# Patient Record
Sex: MALE
[De-identification: names, ages, dates, MRNs, and addresses within clinical notes are randomized per-mention and may not be internally consistent; named-entity substitution may affect disease eponyms.]

---

## 2024-04-08 ENCOUNTER — NON-APPOINTMENT (OUTPATIENT)
Age: 44
End: 2024-04-08

## 2024-04-08 ENCOUNTER — APPOINTMENT (OUTPATIENT)
Dept: OPHTHALMOLOGY | Facility: CLINIC | Age: 44
End: 2024-04-08
Payer: COMMERCIAL

## 2024-04-08 PROCEDURE — 92286 ANT SGM IMG I&R SPECLR MIC: CPT

## 2024-04-08 PROCEDURE — 92025 CPTRIZED CORNEAL TOPOGRAPHY: CPT

## 2024-04-08 PROCEDURE — 92250 FUNDUS PHOTOGRAPHY W/I&R: CPT

## 2024-04-08 PROCEDURE — 92004 COMPRE OPH EXAM NEW PT 1/>: CPT

## 2024-08-12 NOTE — ASU PATIENT PROFILE, ADULT - FALL HARM RISK - UNIVERSAL INTERVENTIONS
Bed in lowest position, wheels locked, appropriate side rails in place/Call bell, personal items and telephone in reach/Instruct patient to call for assistance before getting out of bed or chair/Non-slip footwear when patient is out of bed/[place] to call system/Physically safe environment - no spills, clutter or unnecessary equipment/Purposeful Proactive Rounding/Room/bathroom lighting operational, light cord in reach

## 2024-08-12 NOTE — ASU PATIENT PROFILE, ADULT - NS PREOP UNDERSTANDS INFO
NPO 8hrs prior to surgery solids, clear fluid (water, clear apple juice, ginger ale) up to 3hrs prior escort required for discharge home, bring picture ID and insurance info.

## 2024-08-12 NOTE — OPERATIVE REPORT - OPERATIVE RPOSRT DETAILS
OPERATING SURGEON:  Himanshu Gruber MD    ASSISTANT SURGEON:  Cynthia Boyce MD    DATE OF SURGERY:  *    ANESTHESIA:  MAC/Peribulbar		    COMPLICATIONS:  none**    ESTIMATED BLOOD LOSS: < 1mL	    PREOPERATIVE DIAGNOSIS:  corneal scar, left eye    POSTOPERATIVE DIAGNOSIS:  same    OPERATIVE PROCEDURE:  Penetrating Keratoplasty, left eye   Donor # **,  ** mm/ **  mm    PROCEDURE:	The patient was brought to the operating room and placed supine on the operating room table.  After uneventful induction of neuroleptic anesthesia, [place] and peribulbar blocks consisting of 4 cc lidocaine 2%/marcaine 0.75% were administered around the operative eye and behind the adjacent auricle.  The patient was prepped and draped in the usual sterile fashion.  A wire lid speculum was inserted into the operative eye giving a wide palpebral fissure.      Using Castrov[place]jo calipers an appropriate size trephination was determined.  ***A Flieringa ring was centered around the corneascleral limbus and secured to superficial sclera with interrupted silk sutures.  Attention was then addressed to the donor cornea which was inspected and found to be suitable for use. The donor was placed endothelial size up on a suction punch block.  A new disposable trephine was then used to fashion a donor corneal button.  This was covered with corneal storage media and set aside for later use. Attention was then readdressed to the recipient.  A new suction trephine was then centered over the cornea.  Suction was attained and cut down continued until the anterior chamber was entered.  The corneal button was then completely excised using a niles knife.  Viscoelastic was then used to coat the lens iris diaphragm.  The previously fashioned corneal donor button was then placed endothelial side down in the recipient bed.  It was secured  into position with 12 interrupted 10-0 nylon sutures at the clock hours.  These sutures were knotted securely and the knots buried.  A single running 10-0 nylon suture was then passed in 12 equally spaced bites between the interrupted sutures.  This suture was pulled tightly and knotted securely.  The knot was then buried in the recipient.  The anterior chamber was maintained throughout the procedure using balanced salt solution.  ***The Flieringa ring was then removed from the eye.  A sub-Tenon’s injection consisting of 4 mg methazone 100 mg cefazolin was administered inferiorly.  The lid speculum was removed from the eye and a shield and dressing placed over the eye.       The patient tolerated the procedure well and went to the recovery area in good condition.      ATTESTATION    I, Himanshu Gruber, was present for the entirety of the surgical procedure.  					     OPERATING SURGEON:  Himanshu Gruber MD    ASSISTANT SURGEON:  Cynthia Boyce MD    DATE OF SURGERY: 8/13/24    ANESTHESIA:  General     COMPLICATIONS:  none    ESTIMATED BLOOD LOSS: < 1mL	    PREOPERATIVE DIAGNOSIS:  corneal scar, left eye    POSTOPERATIVE DIAGNOSIS:  same    OPERATIVE PROCEDURE:  Penetrating Keratoplasty, left eye   Donor # [id number],  8.25 mm/ 8.5  mm    PROCEDURE:	The patient was brought to the operating room and placed supine on the operating room table.  After uneventful induction of general anesthesia, the patient was prepped and draped in the usual sterile fashion.  A wire lid speculum was inserted into the operative eye giving a wide palpebral fissure.      Using Castroviejo calipers an appropriate size trephination was determined.  A Flieringa ring was centered around the limbus and secured to superficial sclera with interrupted silk sutures.  Attention was then addressed to the donor cornea which was inspected and found to be suitable for use. The donor was placed endothelial size up on a suction punch block.  A new disposable trephine was then used to fashion a donor corneal button.  This was covered with corneal storage media and set aside for later use. Attention was then readdressed to the recipient.  A new suction trephine was then centered over the cornea.  Suction was attained and cut down continued until the anterior chamber was entered.  The corneal button was then completely excised using a niles knife.  Viscoelastic was then used to coat the lens iris diaphragm.  The previously fashioned corneal donor button was then placed endothelial side down in the recipient bed.  It was secured  into position with 12 interrupted 10-0 nylon sutures at the clock hours.  These sutures were knotted securely and the knots buried.  A single running 10-0 nylon suture was then passed in 12 equally spaced bites between the interrupted sutures.  This suture was pulled tightly and knotted securely.  The knot was then buried in the recipient.  The anterior chamber was maintained throughout the procedure using balanced salt solution. The Flieringa ring was then removed from the eye.  A sub-Tenon’s injection consisting of 4 mg methazone 100 mg cefazolin was administered inferiorly.  The lid speculum was removed from the eye and a shield and dressing placed over the eye.       The patient tolerated the procedure well and went to the recovery area in good condition.      ATTESTATION    I, Himanshu Gruber, was present for the entirety of the surgical procedure.  					     OPERATING SURGEON:  Himanshu Gruber MD    ASSISTANT SURGEON:  Cynthia Boyce MD    DATE OF SURGERY: 8/13/24    ANESTHESIA:  General     COMPLICATIONS:  none    ESTIMATED BLOOD LOSS: < 1mL	    PREOPERATIVE DIAGNOSIS:  corneal scar, left eye    POSTOPERATIVE DIAGNOSIS:  same    OPERATIVE PROCEDURE:  Penetrating Keratoplasty, left eye   Donor # [id number],  8.25 mm/ 8.25  mm    PROCEDURE:	The patient was brought to the operating room and placed supine on the operating room table.  After uneventful induction of general anesthesia, the patient was prepped and draped in the usual sterile fashion.  A wire lid speculum was inserted into the operative eye giving a wide palpebral fissure.      Using Castroviejo calipers an appropriate size trephination was determined.  A Flieringa ring was centered around the limbus and secured to superficial sclera with interrupted silk sutures.  Attention was then addressed to the donor cornea which was inspected and found to be suitable for use. The donor was placed endothelial size up on a suction punch block.  A new disposable trephine was then used to fashion a donor corneal button.  This was covered with corneal storage media and set aside for later use. Attention was then readdressed to the recipient.  A new suction trephine was then centered over the cornea.  Suction was attained and cut down continued until the anterior chamber was entered.  The corneal button was then completely excised using a niles knife.  Viscoelastic was then used to coat the lens iris diaphragm.  The previously fashioned corneal donor button was then placed endothelial side down in the recipient bed.  It was secured  into position with 12 interrupted 10-0 nylon sutures at the clock hours.  These sutures were knotted securely and the knots buried.  A single running 10-0 nylon suture was then passed in 12 equally spaced bites between the interrupted sutures.  This suture was pulled tightly and knotted securely.  The knot was then buried in the recipient.  The anterior chamber was maintained throughout the procedure using balanced salt solution. The Flieringa ring was then removed from the eye.  A sub-Tenon’s injection consisting of 4 mg methazone 100 mg cefazolin was administered inferiorly.  The lid speculum was removed from the eye and a shield and dressing placed over the eye.       The patient tolerated the procedure well and went to the recovery area in good condition.      ATTESTATION    I, Himanshu Gruber, was present for the entirety of the surgical procedure.

## 2024-08-13 ENCOUNTER — APPOINTMENT (OUTPATIENT)
Dept: OPHTHALMOLOGY | Facility: AMBULATORY SURGERY CENTER | Age: 44
End: 2024-08-13

## 2024-08-13 ENCOUNTER — TRANSCRIPTION ENCOUNTER (OUTPATIENT)
Age: 44
End: 2024-08-13

## 2024-08-13 ENCOUNTER — OUTPATIENT (OUTPATIENT)
Dept: OUTPATIENT SERVICES | Facility: HOSPITAL | Age: 44
LOS: 1 days | Discharge: HOME | End: 2024-08-13
Payer: COMMERCIAL

## 2024-08-13 ENCOUNTER — RESULT REVIEW (OUTPATIENT)
Age: 44
End: 2024-08-13

## 2024-08-13 VITALS
DIASTOLIC BLOOD PRESSURE: 80 MMHG | RESPIRATION RATE: 16 BRPM | HEART RATE: 87 BPM | SYSTOLIC BLOOD PRESSURE: 137 MMHG | TEMPERATURE: 98 F | OXYGEN SATURATION: 97 % | WEIGHT: 198.42 LBS | HEIGHT: 66 IN

## 2024-08-13 VITALS
OXYGEN SATURATION: 99 % | RESPIRATION RATE: 13 BRPM | DIASTOLIC BLOOD PRESSURE: 72 MMHG | SYSTOLIC BLOOD PRESSURE: 123 MMHG | HEART RATE: 76 BPM

## 2024-08-13 DIAGNOSIS — Z90.3 ACQUIRED ABSENCE OF STOMACH [PART OF]: Chronic | ICD-10-CM

## 2024-08-13 DIAGNOSIS — Z98.890 OTHER SPECIFIED POSTPROCEDURAL STATES: Chronic | ICD-10-CM

## 2024-08-13 PROCEDURE — 88304 TISSUE EXAM BY PATHOLOGIST: CPT | Mod: 26

## 2024-08-13 PROCEDURE — 65755 CORNEAL TRANSPLANT: CPT | Mod: LT

## 2024-08-13 RX ORDER — DEXTROSE MONOHYDRATE, SODIUM CHLORIDE, SODIUM LACTATE, CALCIUM CHLORIDE, MAGNESIUM CHLORIDE 1.5; 538; 448; 18.4; 5.08 G/100ML; MG/100ML; MG/100ML; MG/100ML; MG/100ML
500 SOLUTION INTRAPERITONEAL ONCE
Refills: 0 | Status: DISCONTINUED | OUTPATIENT
Start: 2024-08-13 | End: 2024-08-13

## 2024-08-13 RX ORDER — FENTANYL CITRATE 1200 UG/1
25 LOZENGE ORAL; TRANSMUCOSAL
Refills: 0 | Status: DISCONTINUED | OUTPATIENT
Start: 2024-08-13 | End: 2024-08-13

## 2024-08-13 RX ORDER — OFLOXACIN 0.3 %
1 DROPS OPHTHALMIC (EYE)
Refills: 0 | Status: COMPLETED | OUTPATIENT
Start: 2024-08-13 | End: 2024-08-13

## 2024-08-13 RX ORDER — FENTANYL CITRATE 1200 UG/1
50 LOZENGE ORAL; TRANSMUCOSAL
Refills: 0 | Status: DISCONTINUED | OUTPATIENT
Start: 2024-08-13 | End: 2024-08-13

## 2024-08-13 RX ORDER — ONDANSETRON HCL/PF 4 MG/2 ML
4 VIAL (ML) INJECTION ONCE
Refills: 0 | Status: DISCONTINUED | OUTPATIENT
Start: 2024-08-13 | End: 2024-08-13

## 2024-08-13 RX ORDER — TETRACAINE HCL 0.5 %
1 DROPS OPHTHALMIC (EYE) ONCE
Refills: 0 | Status: DISCONTINUED | OUTPATIENT
Start: 2024-08-13 | End: 2024-08-13

## 2024-08-13 RX ORDER — ATORVASTATIN CALCIUM 40 MG/1
1 TABLET, FILM COATED ORAL
Refills: 0 | DISCHARGE

## 2024-08-13 RX ORDER — HYDROMORPHONE HCL IN 0.9% NACL 0.2 MG/ML
0.5 PLASTIC BAG, INJECTION (ML) INTRAVENOUS
Refills: 0 | Status: DISCONTINUED | OUTPATIENT
Start: 2024-08-13 | End: 2024-08-13

## 2024-08-13 RX ORDER — DEXTROSE MONOHYDRATE, SODIUM CHLORIDE, SODIUM LACTATE, CALCIUM CHLORIDE, MAGNESIUM CHLORIDE 1.5; 538; 448; 18.4; 5.08 G/100ML; MG/100ML; MG/100ML; MG/100ML; MG/100ML
500 SOLUTION INTRAPERITONEAL
Refills: 0 | Status: DISCONTINUED | OUTPATIENT
Start: 2024-08-13 | End: 2024-08-13

## 2024-08-13 RX ORDER — LISINOPRIL/HYDROCHLOROTHIAZIDE 20-12.5 MG
1 TABLET ORAL
Refills: 0 | DISCHARGE

## 2024-08-13 RX ORDER — ACETAMINOPHEN 500 MG
1000 TABLET ORAL ONCE
Refills: 0 | Status: COMPLETED | OUTPATIENT
Start: 2024-08-13 | End: 2024-08-13

## 2024-08-13 RX ADMIN — Medication 400 MILLIGRAM(S): at 19:41

## 2024-08-13 RX ADMIN — DEXTROSE MONOHYDRATE, SODIUM CHLORIDE, SODIUM LACTATE, CALCIUM CHLORIDE, MAGNESIUM CHLORIDE 100 MILLILITER(S): 1.5; 538; 448; 18.4; 5.08 SOLUTION INTRAPERITONEAL at 19:45

## 2024-08-13 RX ADMIN — Medication 1 DROP(S): at 15:40

## 2024-08-13 RX ADMIN — Medication 1 DROP(S): at 14:40

## 2024-08-13 RX ADMIN — Medication 1 DROP(S): at 14:35

## 2024-08-13 NOTE — PRE-ANESTHESIA EVALUATION ADULT - NSANTHOSAYNRD_GEN_A_CORE
prior to wt loss  had PRATEEK/No. PRATEEK screening performed.  STOP BANG Legend: 0-2 = LOW Risk; 3-4 = INTERMEDIATE Risk; 5-8 = HIGH Risk
No. PRATEEK screening performed.  STOP BANG Legend: 0-2 = LOW Risk; 3-4 = INTERMEDIATE Risk; 5-8 = HIGH Risk

## 2024-08-13 NOTE — PRE-ANESTHESIA EVALUATION ADULT - NSANTHPMHFT_GEN_ALL_CORE
Pt says: gastric surg 1 yr ago, lost 60 lbs,  prior to wt loss had PRTAEEK on CPAP, had DM on metformin,  In 2007 almost passed out, W/u included card cath which he says was negative
Noted

## 2024-08-13 NOTE — PRE-ANESTHESIA EVALUATION ADULT - NSANTHADDINFOFT_GEN_ALL_CORE
Pt accepts all anesthesia risks .IBNLT: Dental, Pharyngeal, Laryngeal, Tracheal Trauma, Sore Throat, Hoarseness, Recurrent Laryngeal Nerve Dysfunction, Upper and Lower Extremity Paresthesias, Nausea and Vomiting, Potential exacerbation of asthma and PRATEEK.

## 2024-08-14 ENCOUNTER — NON-APPOINTMENT (OUTPATIENT)
Age: 44
End: 2024-08-14

## 2024-08-14 ENCOUNTER — APPOINTMENT (OUTPATIENT)
Dept: OPHTHALMOLOGY | Facility: CLINIC | Age: 44
End: 2024-08-14
Payer: COMMERCIAL

## 2024-08-14 PROCEDURE — 99024 POSTOP FOLLOW-UP VISIT: CPT

## 2024-08-17 LAB
-  CEFEPIME: SIGNIFICANT CHANGE UP
-  CEFTAZIDIME: SIGNIFICANT CHANGE UP
-  CIPROFLOXACIN: SIGNIFICANT CHANGE UP
-  IMIPENEM: SIGNIFICANT CHANGE UP
-  LEVOFLOXACIN: SIGNIFICANT CHANGE UP
-  MEROPENEM: SIGNIFICANT CHANGE UP
-  PIPERACILLIN/TAZOBACTAM: SIGNIFICANT CHANGE UP
METHOD TYPE: SIGNIFICANT CHANGE UP

## 2024-08-21 ENCOUNTER — NON-APPOINTMENT (OUTPATIENT)
Age: 44
End: 2024-08-21

## 2024-08-21 ENCOUNTER — APPOINTMENT (OUTPATIENT)
Dept: OPHTHALMOLOGY | Facility: CLINIC | Age: 44
End: 2024-08-21
Payer: COMMERCIAL

## 2024-08-21 PROBLEM — I10 ESSENTIAL (PRIMARY) HYPERTENSION: Chronic | Status: ACTIVE | Noted: 2024-08-12

## 2024-08-21 LAB — SURGICAL PATHOLOGY STUDY: SIGNIFICANT CHANGE UP

## 2024-08-21 PROCEDURE — 99024 POSTOP FOLLOW-UP VISIT: CPT

## 2024-08-30 LAB
-  CLINDAMYCIN: SIGNIFICANT CHANGE UP
-  ERYTHROMYCIN: SIGNIFICANT CHANGE UP
-  GENTAMICIN: SIGNIFICANT CHANGE UP
-  OXACILLIN: SIGNIFICANT CHANGE UP
-  PENICILLIN: SIGNIFICANT CHANGE UP
-  RIFAMPIN: SIGNIFICANT CHANGE UP
-  TETRACYCLINE: SIGNIFICANT CHANGE UP
-  TRIMETHOPRIM/SULFAMETHOXAZOLE: SIGNIFICANT CHANGE UP
-  VANCOMYCIN: SIGNIFICANT CHANGE UP
METHOD TYPE: SIGNIFICANT CHANGE UP

## 2024-08-31 LAB
CULTURE RESULTS: ABNORMAL
ORGANISM # SPEC MICROSCOPIC CNT: ABNORMAL
ORGANISM # SPEC MICROSCOPIC CNT: ABNORMAL
ORGANISM # SPEC MICROSCOPIC CNT: SIGNIFICANT CHANGE UP
SPECIMEN SOURCE: SIGNIFICANT CHANGE UP

## 2024-09-18 ENCOUNTER — APPOINTMENT (OUTPATIENT)
Dept: OPHTHALMOLOGY | Facility: CLINIC | Age: 44
End: 2024-09-18
Payer: COMMERCIAL

## 2024-09-18 ENCOUNTER — NON-APPOINTMENT (OUTPATIENT)
Age: 44
End: 2024-09-18

## 2024-09-18 PROCEDURE — 99024 POSTOP FOLLOW-UP VISIT: CPT

## 2024-11-06 ENCOUNTER — NON-APPOINTMENT (OUTPATIENT)
Age: 44
End: 2024-11-06

## 2024-11-06 ENCOUNTER — APPOINTMENT (OUTPATIENT)
Dept: OPHTHALMOLOGY | Facility: CLINIC | Age: 44
End: 2024-11-06
Payer: COMMERCIAL

## 2024-11-06 PROCEDURE — 92025 CPTRIZED CORNEAL TOPOGRAPHY: CPT

## 2024-11-06 PROCEDURE — 99024 POSTOP FOLLOW-UP VISIT: CPT

## 2024-12-04 ENCOUNTER — NON-APPOINTMENT (OUTPATIENT)
Age: 44
End: 2024-12-04

## 2024-12-04 ENCOUNTER — APPOINTMENT (OUTPATIENT)
Dept: OPHTHALMOLOGY | Facility: CLINIC | Age: 44
End: 2024-12-04
Payer: COMMERCIAL

## 2024-12-04 PROCEDURE — 92012 INTRM OPH EXAM EST PATIENT: CPT

## 2025-01-08 ENCOUNTER — APPOINTMENT (OUTPATIENT)
Dept: OPHTHALMOLOGY | Facility: CLINIC | Age: 45
End: 2025-01-08
Payer: COMMERCIAL

## 2025-01-08 PROCEDURE — 92012 INTRM OPH EXAM EST PATIENT: CPT

## 2025-01-08 PROCEDURE — 92025 CPTRIZED CORNEAL TOPOGRAPHY: CPT

## 2025-02-13 ENCOUNTER — NON-APPOINTMENT (OUTPATIENT)
Age: 45
End: 2025-02-13

## 2025-02-13 ENCOUNTER — APPOINTMENT (OUTPATIENT)
Dept: OPHTHALMOLOGY | Facility: CLINIC | Age: 45
End: 2025-02-13
Payer: COMMERCIAL

## 2025-02-13 PROCEDURE — 92012 INTRM OPH EXAM EST PATIENT: CPT

## 2025-04-28 ENCOUNTER — APPOINTMENT (OUTPATIENT)
Dept: OPHTHALMOLOGY | Facility: CLINIC | Age: 45
End: 2025-04-28
Payer: COMMERCIAL

## 2025-04-28 ENCOUNTER — NON-APPOINTMENT (OUTPATIENT)
Age: 45
End: 2025-04-28

## 2025-04-28 PROCEDURE — 92014 COMPRE OPH EXAM EST PT 1/>: CPT

## 2025-04-28 PROCEDURE — 92250 FUNDUS PHOTOGRAPHY W/I&R: CPT

## 2025-05-23 ENCOUNTER — NON-APPOINTMENT (OUTPATIENT)
Age: 45
End: 2025-05-23

## 2025-05-23 ENCOUNTER — APPOINTMENT (OUTPATIENT)
Dept: OPHTHALMOLOGY | Facility: CLINIC | Age: 45
End: 2025-05-23
Payer: COMMERCIAL

## 2025-05-23 PROCEDURE — 92012 INTRM OPH EXAM EST PATIENT: CPT

## 2025-09-19 ENCOUNTER — NON-APPOINTMENT (OUTPATIENT)
Age: 45
End: 2025-09-19

## 2025-09-19 ENCOUNTER — APPOINTMENT (OUTPATIENT)
Dept: OPHTHALMOLOGY | Facility: CLINIC | Age: 45
End: 2025-09-19
Payer: COMMERCIAL

## 2025-09-19 PROCEDURE — 92012 INTRM OPH EXAM EST PATIENT: CPT

## (undated) DEVICE — MARKING PEN DEVON DUAL TIP W RULER

## (undated) DEVICE — PREP BETADINE 5% STERILE OPTHALMIC SOLUTION

## (undated) DEVICE — SUT NYLON 10-0 12" CU-5

## (undated) DEVICE — NDL RETROBULBAR VISITEC 25X1.5

## (undated) DEVICE — WARMING BLANKET LOWER ADULT

## (undated) DEVICE — SYR LUER LOK 5CC

## (undated) DEVICE — TREPHINE BARRON RADIAL VACUUM 8.25 MM

## (undated) DEVICE — CAPSULE GUARD I/A

## (undated) DEVICE — NDL HYPO SAFE 25G X 5/8" (ORANGE)

## (undated) DEVICE — SUT SILK 7-0 18" TG140-8

## (undated) DEVICE — CANNULA IRR ALCON ANTERIOR CHAMBER 30G

## (undated) DEVICE — KNIFE ALCON STANDARD FULL HANDLE 15 DEG (PINK)

## (undated) DEVICE — SPECIMEN CONTAINER 4OZ

## (undated) DEVICE — GOWN ROYAL SILK XL

## (undated) DEVICE — DRAPE MEDIUM SHEET 44" X 70"

## (undated) DEVICE — SOL IRR BAL SALT 500ML

## (undated) DEVICE — DRAPE MICROSCOPE KNOB COVER SMALL (2 PCS)

## (undated) DEVICE — SYR LUER LOK 10CC

## (undated) DEVICE — Device

## (undated) DEVICE — VENODYNE/SCD SLEEVE CALF MEDIUM

## (undated) DEVICE — PETRI DISH MED 3.5"

## (undated) DEVICE — PUNCH TREPH DISP STRL 8.50MM

## (undated) DEVICE — CULTURESWAB WITHOUT CHARCOAL

## (undated) DEVICE — GLV 8 PROTEXIS (WHITE)

## (undated) DEVICE — SPEAR CELLULOSE 40410

## (undated) DEVICE — DRSG TAPE TRANSPORE 1"

## (undated) DEVICE — PACK ANTERIOR SEGMENT

## (undated) DEVICE — SYR LUER LOK 3CC

## (undated) DEVICE — NDL HYPO SAFE 18G X 1.5" (PINK)